# Patient Record
Sex: MALE | Race: WHITE | NOT HISPANIC OR LATINO | Employment: FULL TIME | URBAN - METROPOLITAN AREA
[De-identification: names, ages, dates, MRNs, and addresses within clinical notes are randomized per-mention and may not be internally consistent; named-entity substitution may affect disease eponyms.]

---

## 2017-03-30 ENCOUNTER — OFFICE VISIT (OUTPATIENT)
Dept: FAMILY MEDICINE | Facility: CLINIC | Age: 37
End: 2017-03-30
Payer: COMMERCIAL

## 2017-03-30 VITALS
SYSTOLIC BLOOD PRESSURE: 124 MMHG | DIASTOLIC BLOOD PRESSURE: 68 MMHG | RESPIRATION RATE: 16 BRPM | TEMPERATURE: 98 F | BODY MASS INDEX: 28.73 KG/M2 | HEIGHT: 69 IN | OXYGEN SATURATION: 100 % | HEART RATE: 72 BPM | WEIGHT: 194 LBS

## 2017-03-30 DIAGNOSIS — Z72.0 TOBACCO ABUSE: ICD-10-CM

## 2017-03-30 DIAGNOSIS — R09.81 NASAL CONGESTION: Primary | ICD-10-CM

## 2017-03-30 DIAGNOSIS — H69.93 DYSFUNCTION OF EUSTACHIAN TUBE, BILATERAL: ICD-10-CM

## 2017-03-30 DIAGNOSIS — E10.9 TYPE 1 DIABETES MELLITUS WITHOUT COMPLICATION (H): ICD-10-CM

## 2017-03-30 LAB — HBA1C MFR BLD: 7.2 % (ref 4.3–6)

## 2017-03-30 PROCEDURE — 99214 OFFICE O/P EST MOD 30 MIN: CPT | Performed by: FAMILY MEDICINE

## 2017-03-30 PROCEDURE — 36415 COLL VENOUS BLD VENIPUNCTURE: CPT | Performed by: FAMILY MEDICINE

## 2017-03-30 PROCEDURE — 83036 HEMOGLOBIN GLYCOSYLATED A1C: CPT | Performed by: FAMILY MEDICINE

## 2017-03-30 PROCEDURE — 80053 COMPREHEN METABOLIC PANEL: CPT | Performed by: FAMILY MEDICINE

## 2017-03-30 PROCEDURE — 82043 UR ALBUMIN QUANTITATIVE: CPT | Performed by: FAMILY MEDICINE

## 2017-03-30 PROCEDURE — 80061 LIPID PANEL: CPT | Performed by: FAMILY MEDICINE

## 2017-03-30 PROCEDURE — 84443 ASSAY THYROID STIM HORMONE: CPT | Performed by: FAMILY MEDICINE

## 2017-03-30 RX ORDER — NICOTINE 21 MG/24HR
1 PATCH, TRANSDERMAL 24 HOURS TRANSDERMAL EVERY 24 HOURS
Qty: 30 PATCH | Refills: 1 | Status: SHIPPED | OUTPATIENT
Start: 2017-03-30

## 2017-03-30 RX ORDER — FLUTICASONE PROPIONATE 50 MCG
1-2 SPRAY, SUSPENSION (ML) NASAL DAILY
Qty: 1 BOTTLE | Refills: 11 | Status: SHIPPED | OUTPATIENT
Start: 2017-03-30

## 2017-03-30 NOTE — PROGRESS NOTES
"Chief Complaint   Patient presents with     Ear Problem     ?? SINUS        Initial /68  Pulse 72  Temp 98  F (36.7  C)  Resp 16  Ht 5' 9\" (1.753 m)  Wt 194 lb (88 kg)  SpO2 100%  BMI 28.65 kg/m2 Estimated body mass index is 28.65 kg/(m^2) as calculated from the following:    Height as of this encounter: 5' 9\" (1.753 m).    Weight as of this encounter: 194 lb (88 kg).  Medication Reconciliation: complete. KATRINA Murphy LPN        SUBJECTIVE:                                                    Walt Butler is a 37 year old male who presents to clinic today for the following health issues:      Acute Illness   Acute illness concerns: c/o ears being plugged and pain   Onset: 1 month or more     Fever: no    Chills/Sweats: no    Headache (location?): YES    Sinus Pressure:YES    Conjunctivitis:  YES- pressure    Ear Pain: YES- bilateral plugged and pain,      sometimes feel  uncomfortable     Rhinorrhea: no    Congestion: YES- nasal    Sore Throat: no     Cough: no    Wheeze: no    Decreased Appetite: no    Nausea: no    Vomiting: no    Diarrhea:  no    Dysuria/Freq.: no    Fatigue/Achiness: no    Sick/Strep Exposure: no    He was treated for possible tooth infection couple of months  ago , he us better but still has ongoing congestion     He is a  Smoker for 20+ yrs, smoking 10-12 cig/ day, he know he has to quit  although he has not tried. He   is willing to quit. He has some dental issue , so he has to implants etc in next 6-8 months so he has been told that he should quit before taht      Therapies Tried and outcome: nothing          Diabetes Follow-up    Patient is checking blood sugars: three times daily.   : Patient modifying lifestyle changes (diet, exercise) with blood sugars  Results are as follows:         am - 70-80 fasting     Diabetic concerns: None, he has large deductible, so unable to afford the cost, that's why he does not come in often for  visit and labs often but willing to proceed with " "labs today      Symptoms of hypoglycemia (low blood sugar): none     Paresthesias (numbness or burning in feet) or sores: No     Date of last diabetic eye exam:  Year ago, planning to schedule again      Lipid Follow-Up      Rate your low fat/cholesterol diet?: good    Taking statin?  No    Other lipid medications/supplements?:  none       Problem list and histories reviewed & adjusted, as indicated.  Additional history: as documented    Patient Active Problem List   Diagnosis     Type 1 diabetes, HbA1c goal < 8% (H)     Hyperlipidemia with target LDL less than 100     Tobacco abuse     Plantar wart     Type 1 diabetes mellitus without complication (H)     Past Surgical History:   Procedure Laterality Date     C NONSPECIFIC PROCEDURE  2001    lipoma       Social History   Substance Use Topics     Smoking status: Current Every Day Smoker     Packs/day: 0.75     Years: 7.00     Types: Cigarettes     Smokeless tobacco: Never Used      Comment: quit  due to wife's pregnancy     Alcohol use No     Family History   Problem Relation Age of Onset     Family History Negative Mother      Family History Negative Father      CANCER Maternal Grandfather      lung           Reviewed and updated as needed this visit by clinical staff  Tobacco  Allergies  Meds  Fam Hx  Soc Hx      Reviewed and updated as needed this visit by Provider         ROS:  Constitutional, HEENT, cardiovascular, pulmonary, GI, , musculoskeletal, neuro, skin, endocrine and psych systems are negative, except as otherwise noted.    OBJECTIVE:                                                    /68  Pulse 72  Temp 98  F (36.7  C)  Resp 16  Ht 5' 9\" (1.753 m)  Wt 194 lb (88 kg)  SpO2 100%  BMI 28.65 kg/m2  Body mass index is 28.65 kg/(m^2).  GENERAL: healthy, alert and no distress  EYES: Eyes grossly normal to inspection, PERRL and conjunctivae and sclerae normal  HENT: ear canals and TM's normal, mouth without ulcers or lesions, nose " with swollen turbinates and clear  rhinorrhea . Throat with mild pharyngeal erythema, no sinus  tenderness  NECK: no adenopathy,  thyroid normal to palpation  RESP: lungs clear to auscultation - no rales, rhonchi or wheezes  CV: regular rate and rhythm, normal S1 S2, no S3 or S4, no murmur  ABDOMEN: soft, nontender, no hepatosplenomegaly, no masses and bowel sounds normal  MS:legs  no edema  SKIN: no suspicious lesions or rashes  NEURO: Normal strength and tone, mentation intact and speech normal  Diabetic foot exam: normal DP and PT pulses, no trophic changes or ulcerative lesions and normal sensory exam         ASSESSMENT/PLAN:                                                        (H69.83) Dysfunction of eustachian tube, bilateral  Comment: Nasal congestion, related   Plan: fluticasone (FLONASE) 50 MCG/ACT spray      (R09.81) Nasal congestion  (primary encounter diagnosis)  Comment:   Plan: fluticasone (FLONASE) 50 MCG/ACT spray        Willing to try Flonase to see if helps . F/u in 4-6 weeks if no improvement or ongoing problem, consider further evaluation if needed     (E10.9) Type 1 diabetes mellitus without complication (H)  Comment:   Plan: Hemoglobin A1c, LDL cholesterol direct, Albumin        Random Urine Quantitative, TSH with free T4         reflex, Comprehensive metabolic panel, FOOT         EXAM         Discussed cares, diet/ exercise . Talked about DM management , health risk etc. . Check lab, call pt with results. . Follow up as needed      (Z72.0) Tobacco abuse  Comment:   Plan: nicotine (NICODERM CQ) 14 MG/24HR 24 hr patch        encouraged pt to quit smoking and spent sometimes talking about ways to quit smoking.  info given .  willing to try patch. follow up in 4-8 weeks, sooner if problem.        Patient expressed understanding and agreement with treatment plan. All patient's questions were answered, will let me know if has more later.  Medications: Rx's: Reviewed the potential side  effects/complications of medications prescribed.       Leslie Kaminski MD  [facility] [place]

## 2017-03-30 NOTE — LETTER
[address] [place] [address]   [phone]      March 31, 2017    Walt Butler  [address] [facility]  [place] [address]      Walt,    Jeyson is a copy of your recent HA1C lab test. It looks good. Better than your previous. You can continue to manage your DM with diet and exercise. You should recheck in 4-6 months. If you have any further questions please do not hesitate to call my office.            Sincerely,   Leslie Kaminski M.D.

## 2017-03-30 NOTE — MR AVS SNAPSHOT
After Visit Summary   3/30/2017    Walt Butler    MRN: [id number]           Patient Information     Date Of Birth          1980        Visit Information        Provider Department      3/30/2017 3:00 PM Leslie Kaminski MD [facility]        Today's Diagnoses     Nasal congestion    -  1    Type 1 diabetes mellitus without complication (H)        Dysfunction of eustachian tube, bilateral        Tobacco abuse          Care Instructions      The Benefits of Living Smoke Free  What do you want to gain from quitting? Check off some reasons to quit.  Health benefits  ___  Improve my ability to breathe without coughing or shortness of breath  ___  Reduce my risk of lung cancer, heart disease, chronic lung disease  ___  Have fewer wrinkles and softer skin  ___  Improve my sense of taste and smell  ___  For pregnant women--reduce the risk of having a miscarriage, stillbirth, premature birth, or low-birth-weight baby  Personal benefits  ___  Feel more in control of my life  ___  Have better-smelling hair, breath, clothes, home, and car  ___  Save time by not having to take smoke breaks, buy cigarettes, or hunt for a light  ___  Have whiter teeth  Family benefits  ___  Reduce my children s respiratory tract infections  ___  Set a good example for my children  ___  Reduce my family s cancer risk  Financial benefits  ___  Save hundreds of dollars each year that would be spent on cigarettes  ___  Save money on medical bills  ___  Save on life, health, and car insurance premiums     Those dollars add up!  Cigarettes are expensive, and getting more expensive all the time. Do you realize how much money you are spending on cigarettes per year? What is the average amount you spend on a pack of cigarettes? What is the average number of packs that you smoke per day? Using your answers to these questions, fill in this formula to help you find out:  ($ _____ per pack) ×  ( _____ number of packs  per day) × (365 days) =  $ _____ yearly cost of smoking  Besides tobacco, there are other costs, including extra cleaning bills and replacement costs for clothing and furniture; medical expenses for smoking-related illnesses; and higher health, life, and car insurance premiums.  Cigars and pipes count too!  Cigars and pipes are also dangerous. So are smokeless (chewing) tobacco and snuff. All of these products contain nicotine, a highly addictive substance that has harmful effects on your body. Quitting smoking means giving up all tobacco products.      For more information    smokefree.gov/zwyk-xb-sn-expert    National Cancer [place] Smoking Quitline: 877-44U-QUIT (792-035-2008)     2619-8342 [employer]. [address] [place] [address]. All rights reserved. This information is not intended as a substitute for professional medical care. Always follow your healthcare professional's instructions.        Planning to Quit Smoking  Your healthcare provider may have told you that you need to give up tobacco. Only you can decide if and when you are ready to quit. Quitting is hard to do. But the benefits will be worth it. When you  decide to quit, come up with a plan that s right for you. Discuss your plan with your healthcare provider. And talk to your provider about medicines to help you quit.    Line up support  To quit smoking, you ll need a plan and some help. Pick a date within the next 2 to 4 weeks to quit. Use the time between now and that date to arrange for support.    Classes and counselors. Quit-smoking classes  people like you through the process. Get to know others in a class, and support each other beyond the class. Telephone counseling also helps you keep on track. Ask your healthcare provider, local hospital, or public health department to put you in touch with a class and a phone counselor.    Family and friends. Tell your family and friends about your quit date. Ask them to  support your change. If they smoke, arrange to see them in smoke-free places. Forbid smoking in your home.     Finding something to replace cigarettes may be hard to do. Be aware that some things you choose may be as harmful as cigarettes:    Smokeless (chewing) tobacco is just as harmful as regular tobacco. Tobacco should not be used as a substitute for cigarettes.    Herbal medicines or teas may affect how your body handles nicotine. Talk to your healthcare provider before using these products.    E-cigarettes have less toxins than the smoke from a regular cigarette. But the FDA says that these devices may still have substances that can cause cancer. E-cigarettes are not well regulated. They have not been studied enough to know if they are a good aid to help you stop smoking. Talk with your healthcare provider befor using these products.      Quit-smoking products  Many products can help you quit smoking. Some are prescription medicines that help curb your cravings and withdrawal symptoms. Other products slowly lessen the level of nicotine your body absorbs. Nicotine is the highly addictive substance found in cigarettes, cigars, and chewing tobacco. Nicotine replacement products can help get your body used to slowly decreasing amounts of nicotine after you quit smoking. These products include a nicotine patch, gum, lozenge, nasal spray, and inhaler. Be sure you follow the directions for your medicine or product carefully. Your healthcare provider may tell you to start taking the prescription medicine a week before you plan to quit. Do not smoke while you use nicotine products. Doing so can be very harmful to your health.  For more information    smokefree.gov/wefn-tz-qg-expert    National Cancer [place] Smoking Quitline: 877-44U-QUIT (008-858-6632)     1911-9533 The [employer]. [address] [place] [address]. All rights reserved. This information is not intended as a substitute for  professional medical care. Always follow your healthcare professional's instructions.        Understanding Nasal Allergies  Nasal allergies (also called allergic rhinitis) are a common health problem. They may be seasonal.This means they cause symptoms only at certain times of the year. Or they may be perennial.This means they cause symptoms all year long. Other health problems, such as asthma, often occur along with allergies as well.    What Is an allergic reaction?  An allergy is a reaction to a substance called an allergen. Common allergens include:    Wind-borne pollen    Mold    Dust mites    Furry and feathered animals    Cockroaches  Normally, allergens are harmless. But when a person has allergies, their body thinks they are harmful. Their body then attacks allergens with antibodies. Antibodies are attached to special cells called mast cells. Allergens stick to the antibodies. This makes the mast cells release histamine and other chemicals. This is an allergic reaction. The chemicals irritate nearby nasal tissue. This causes nasal allergy symptoms.  Common nasal allergy symptoms  Allergies can cause nasal tissue to swell. This makes the air passages smaller. The nose may feel stuffed up. The nose may also make extra mucus, which can plug the nasal passages or drip out of the nose. Mucus can drip down the back of the throat (postnasal drip) as well. Sinus tissue can swell. This may cause pain and headache. Common allergy symptoms include:    Runny nose with clear, watery discharge    Stuffy nose (nasal congestion)    Drainage down your throat (postnasal drip)    Sneezing    Red, watery eyes    Itchy nose, eyes, ears, and throat    Plugged-up ears (ear congestion)    Sore throat    Coughing    Sinus pain and swelling    Headache  It may not be allergies  Other health problems can cause symptoms like those of nasal allergies. These include:    Nonallergic rhinitis and viruses such as colds    Irritants and  "pollutants, such as strong odors or smoke    Certain medications    Changes in the weather   Treatment  Your health care provider will evaluate you to find the cause of your symptoms then recommend treatment. You may also be referred to an allergist.     3603-0645 [employer]. [address] [place] [address]. All rights reserved. This information is not intended as a substitute for professional medical care. Always follow your healthcare professional's instructions.              Follow-ups after your visit        Who to contact     If you have questions or need follow up information about today's clinic visit or your schedule please contact [facility] [place] directly at [phone].  Normal or non-critical lab and imaging results will be communicated to you by MyChart, letter or phone within 4 business days after the clinic has received the results. If you do not hear from us within 7 days, please contact the clinic through [employer] or phone. If you have a critical or abnormal lab result, we will notify you by phone as soon as possible.  Submit refill requests through [employer] or call your pharmacy and they will forward the refill request to us. Please allow 3 business days for your refill to be completed.          Additional Information About Your Visit        [employer] Information     [facility] lets you send messages to your doctor, view your test results, renew your prescriptions, schedule appointments and more. To sign up, go to [email or web address] . Click on \"Log in\" on the left side of the screen, which will take you to the Welcome page. Then click on \"Sign up Now\" on the right side of the page.     You will be asked to enter the access code listed below, as well as some personal information. Please follow the directions to create your username and password.     Your access code is: [email or web address]  Expires: 2017  3:34 PM     Your access code will  in 90 days. If you " "need help or a new code, please call your [facility] clinic or [phone].        Care EveryWhere ID     This is your Care EveryWhere ID. This could be used by other organizations to access your [facility] medical records  [id number]        Your Vitals Were     Pulse Temperature Respirations Height Pulse Oximetry BMI (Body Mass Index)    72 98  F (36.7  C) 16 5' 9\" (1.753 m) 100% 28.65 kg/m2       Blood Pressure from Last 3 Encounters:   03/30/17 124/68   05/16/16 98/60   04/06/15 118/60    Weight from Last 3 Encounters:   03/30/17 194 lb (88 kg)   05/16/16 178 lb 6.4 oz (80.9 kg)   04/06/15 178 lb (80.7 kg)              We Performed the Following     Albumin Random Urine Quantitative     Comprehensive metabolic panel     Hemoglobin A1c     LDL cholesterol direct     TSH with free T4 reflex          Today's Medication Changes          These changes are accurate as of: 3/30/17  3:34 PM.  If you have any questions, ask your nurse or doctor.               Start taking these medicines.        Dose/Directions    fluticasone 50 MCG/ACT spray   Commonly known as:  FLONASE   Used for:  Nasal congestion   Started by:  Leslie Kaminski MD        Dose:  1-2 spray   Spray 1-2 sprays into both nostrils daily   Quantity:  1 Bottle   Refills:  11       nicotine 14 MG/24HR 24 hr patch   Commonly known as:  NICODERM CQ   Used for:  Tobacco abuse   Started by:  Leslie Kaminski MD        Dose:  1 patch   Place 1 patch onto the skin every 24 hours   Quantity:  30 patch   Refills:  1            Where to get your medicines      These medications were sent to [facility] [address] [facility] [place] - [address] [facility] [address]  [address] [facility] [place] [address]    Hours:  Added 10/26 CK Checked with pharmacy Phone:  [phone]     fluticasone 50 MCG/ACT spray    nicotine 14 MG/24HR 24 hr patch                Primary Care Provider Office Phone # Fax #    DEIRDRE Saunders [phone]       " [facility] [address] [place] [address]        Thank you!     Thank you for choosing [facility] [place]  for your care. Our goal is always to provide you with excellent care. Hearing back from our patients is one way we can continue to improve our services. Please take a few minutes to complete the written survey that you may receive in the mail after your visit with us. Thank you!             Your Updated Medication List - Protect others around you: Learn how to safely use, store and throw away your medicines at www.disposemymeds.org.          This list is accurate as of: 3/30/17  3:34 PM.  Always use your most recent med list.                   Brand Name Dispense Instructions for use    ACCU-CHEK BIBI Kit     1 kit    daily       * ACE/ARB NOT PRESCRIBED (INTENTIONAL)      ACE & ARB not prescribed due to Other:       aspirin 81 MG tablet     100    1 tab po QD (Once per day)       blood glucose monitoring lancets     100 each    6 times daily. Before each meal, 2 hours after a meal and bedtime       fluticasone 50 MCG/ACT spray    FLONASE    1 Bottle    Spray 1-2 sprays into both nostrils daily       glucagon 1 MG kit    GLUCAGON EMERGENCY    1 mg    Inject 1 mg Subcutaneous once for 1 dose       nicotine 14 MG/24HR 24 hr patch    NICODERM CQ    30 patch    Place 1 patch onto the skin every 24 hours       NovoLIN N VIAL 100 UNIT/ML injection   Generic drug:  insulin NPH      Inject 15 Units Subcutaneous 2 times daily       NovoLIN R VIAL 100 UNIT/ML injection   Generic drug:  insulin regular      Inject 15 Units Subcutaneous 2 times daily (before meals)       ONE TOUCH ULTRA test strip   Generic drug:  blood glucose monitoring     200 strip    by In Vitro route See Admin Instructions. Up to 6 times daily       * STATIN NOT PRESCRIBED (INTENTIONAL)     0 each    continuous prn Statin not prescribed intentionally due to Refusal by patient       * Notice:  This list has 2  medication(s) that are the same as other medications prescribed for you. Read the directions carefully, and ask your doctor or other care provider to review them with you.

## 2017-03-30 NOTE — LETTER
[address] [place] [address]   [phone]      March 31, 2017    Walt Butler  [address] [facility]  [place] [address]            Dear Julio Luke    Enclosed is a copy of your recent lab results. If you have any further questions please do not hesitate to call my office.    Normal urine micro albumin (diabetes test for kidneys)  Normal complete metabolic panel that includes your liver function tests, kidney functions and  electrolytes(various salts in your body)    Normal thyroid ( TSH)  Normal lipid panel except slightly low HDL (good cholesterol). Low fat, High fiber, diet/ exercise can improve this number. Ok to watch and recheck in one year                          Sincerely,   Leslie Kaminski M.D.

## 2017-03-30 NOTE — PATIENT INSTRUCTIONS
The Benefits of Living Smoke Free  What do you want to gain from quitting? Check off some reasons to quit.  Health benefits  ___  Improve my ability to breathe without coughing or shortness of breath  ___  Reduce my risk of lung cancer, heart disease, chronic lung disease  ___  Have fewer wrinkles and softer skin  ___  Improve my sense of taste and smell  ___  For pregnant women reduce the risk of having a miscarriage, stillbirth, premature birth, or low-birth-weight baby  Personal benefits  ___  Feel more in control of my life  ___  Have better-smelling hair, breath, clothes, home, and car  ___  Save time by not having to take smoke breaks, buy cigarettes, or hunt for a light  ___  Have whiter teeth  Family benefits  ___  Reduce my children s respiratory tract infections  ___  Set a good example for my children  ___  Reduce my family s cancer risk  Financial benefits  ___  Save hundreds of dollars each year that would be spent on cigarettes  ___  Save money on medical bills  ___  Save on life, health, and car insurance premiums     Those dollars add up!  Cigarettes are expensive, and getting more expensive all the time. Do you realize how much money you are spending on cigarettes per year? What is the average amount you spend on a pack of cigarettes? What is the average number of packs that you smoke per day? Using your answers to these questions, fill in this formula to help you find out:  ($ _____ per pack) ×  ( _____ number of packs per day) × (365 days) =  $ _____ yearly cost of smoking  Besides tobacco, there are other costs, including extra cleaning bills and replacement costs for clothing and furniture; medical expenses for smoking-related illnesses; and higher health, life, and car insurance premiums.  Cigars and pipes count too!  Cigars and pipes are also dangerous. So are smokeless (chewing) tobacco and snuff. All of these products contain nicotine, a highly addictive substance that has harmful effects  on your body. Quitting smoking means giving up all tobacco products.      For more information    smokefree.gov/xsly-to-sk-expert    National Cancer [place] Smoking Quitline: 877-44U-QUIT (378-332-7839)     3027-2619 [employer]. [address] [place] [address]. All rights reserved. This information is not intended as a substitute for professional medical care. Always follow your healthcare professional's instructions.        Planning to Quit Smoking  Your healthcare provider may have told you that you need to give up tobacco. Only you can decide if and when you are ready to quit. Quitting is hard to do. But the benefits will be worth it. When you  decide to quit, come up with a plan that s right for you. Discuss your plan with your healthcare provider. And talk to your provider about medicines to help you quit.    Line up support  To quit smoking, you ll need a plan and some help. Pick a date within the next 2 to 4 weeks to quit. Use the time between now and that date to arrange for support.    Classes and counselors. Quit-smoking classes  people like you through the process. Get to know others in a class, and support each other beyond the class. Telephone counseling also helps you keep on track. Ask your healthcare provider, local hospital, or public health department to put you in touch with a class and a phone counselor.    Family and friends. Tell your family and friends about your quit date. Ask them to support your change. If they smoke, arrange to see them in smoke-free places. Forbid smoking in your home.     Finding something to replace cigarettes may be hard to do. Be aware that some things you choose may be as harmful as cigarettes:    Smokeless (chewing) tobacco is just as harmful as regular tobacco. Tobacco should not be used as a substitute for cigarettes.    Herbal medicines or teas may affect how your body handles nicotine. Talk to your healthcare provider before using  these products.    E-cigarettes have less toxins than the smoke from a regular cigarette. But the FDA says that these devices may still have substances that can cause cancer. E-cigarettes are not well regulated. They have not been studied enough to know if they are a good aid to help you stop smoking. Talk with your healthcare provider befor using these products.      Quit-smoking products  Many products can help you quit smoking. Some are prescription medicines that help curb your cravings and withdrawal symptoms. Other products slowly lessen the level of nicotine your body absorbs. Nicotine is the highly addictive substance found in cigarettes, cigars, and chewing tobacco. Nicotine replacement products can help get your body used to slowly decreasing amounts of nicotine after you quit smoking. These products include a nicotine patch, gum, lozenge, nasal spray, and inhaler. Be sure you follow the directions for your medicine or product carefully. Your healthcare provider may tell you to start taking the prescription medicine a week before you plan to quit. Do not smoke while you use nicotine products. Doing so can be very harmful to your health.  For more information    smokefree.gov/omyh-dn-pv-expert    National Cancer [place] Smoking Quitline: 877-44U-QUIT (670-256-9294)     8616-1771 [employer]. [address] [place] [address], [place] [address]. All rights reserved. This information is not intended as a substitute for professional medical care. Always follow your healthcare professional's instructions.        Understanding Nasal Allergies  Nasal allergies (also called allergic rhinitis) are a common health problem. They may be seasonal.This means they cause symptoms only at certain times of the year. Or they may be perennial.This means they cause symptoms all year long. Other health problems, such as asthma, often occur along with allergies as well.    What Is an allergic reaction?  An allergy is a  reaction to a substance called an allergen. Common allergens include:    Wind-borne pollen    Mold    Dust mites    Furry and feathered animals    Cockroaches  Normally, allergens are harmless. But when a person has allergies, their body thinks they are harmful. Their body then attacks allergens with antibodies. Antibodies are attached to special cells called mast cells. Allergens stick to the antibodies. This makes the mast cells release histamine and other chemicals. This is an allergic reaction. The chemicals irritate nearby nasal tissue. This causes nasal allergy symptoms.  Common nasal allergy symptoms  Allergies can cause nasal tissue to swell. This makes the air passages smaller. The nose may feel stuffed up. The nose may also make extra mucus, which can plug the nasal passages or drip out of the nose. Mucus can drip down the back of the throat (postnasal drip) as well. Sinus tissue can swell. This may cause pain and headache. Common allergy symptoms include:    Runny nose with clear, watery discharge    Stuffy nose (nasal congestion)    Drainage down your throat (postnasal drip)    Sneezing    Red, watery eyes    Itchy nose, eyes, ears, and throat    Plugged-up ears (ear congestion)    Sore throat    Coughing    Sinus pain and swelling    Headache  It may not be allergies  Other health problems can cause symptoms like those of nasal allergies. These include:    Nonallergic rhinitis and viruses such as colds    Irritants and pollutants, such as strong odors or smoke    Certain medications    Changes in the weather   Treatment  Your health care provider will evaluate you to find the cause of your symptoms then recommend treatment. You may also be referred to an allergist.     3744-5097 [employer]. [address] [place] [address], [place] [address]. All rights reserved. This information is not intended as a substitute for professional medical care. Always follow your healthcare professional's  instructions.

## 2017-03-31 LAB
ALBUMIN SERPL-MCNC: 4.2 G/DL (ref 3.4–5)
ALP SERPL-CCNC: 79 U/L (ref 40–150)
ALT SERPL W P-5'-P-CCNC: 16 U/L (ref 0–70)
ANION GAP SERPL CALCULATED.3IONS-SCNC: 7 MMOL/L (ref 3–14)
AST SERPL W P-5'-P-CCNC: 16 U/L (ref 0–45)
BILIRUB SERPL-MCNC: 0.2 MG/DL (ref 0.2–1.3)
BUN SERPL-MCNC: 9 MG/DL (ref 7–30)
CALCIUM SERPL-MCNC: 9.3 MG/DL (ref 8.5–10.1)
CHLORIDE SERPL-SCNC: 105 MMOL/L (ref 94–109)
CHOLEST SERPL-MCNC: 149 MG/DL
CO2 SERPL-SCNC: 28 MMOL/L (ref 20–32)
CREAT SERPL-MCNC: 0.86 MG/DL (ref 0.66–1.25)
CREAT UR-MCNC: 175 MG/DL
GFR SERPL CREATININE-BSD FRML MDRD: ABNORMAL ML/MIN/1.7M2
GLUCOSE SERPL-MCNC: 61 MG/DL (ref 70–99)
HDLC SERPL-MCNC: 35 MG/DL
LDLC SERPL CALC-MCNC: 89 MG/DL
MICROALBUMIN UR-MCNC: 13 MG/L
MICROALBUMIN/CREAT UR: 7.54 MG/G CR (ref 0–17)
NONHDLC SERPL-MCNC: 114 MG/DL
POTASSIUM SERPL-SCNC: 4.1 MMOL/L (ref 3.4–5.3)
PROT SERPL-MCNC: 7.5 G/DL (ref 6.8–8.8)
SODIUM SERPL-SCNC: 140 MMOL/L (ref 133–144)
TRIGL SERPL-MCNC: 124 MG/DL
TSH SERPL DL<=0.005 MIU/L-ACNC: 2.64 MU/L (ref 0.4–4)

## 2017-06-22 ENCOUNTER — TELEPHONE (OUTPATIENT)
Dept: FAMILY MEDICINE | Facility: CLINIC | Age: 37
End: 2017-06-22

## 2017-06-23 NOTE — TELEPHONE ENCOUNTER
Form faxed to [place] [employer] [phone]  Original/confirmation mailed to patient  Copy sent to [employer]  Alea BETTS

## 2017-08-22 ENCOUNTER — TELEPHONE (OUTPATIENT)
Dept: FAMILY MEDICINE | Facility: CLINIC | Age: 37
End: 2017-08-22

## 2017-08-22 NOTE — TELEPHONE ENCOUNTER
Form filled previously was for  [place] [employer] , bc of his dm, not disability. So need to be seen to evaluate ( not sure why is he filling disability)

## 2017-08-22 NOTE — TELEPHONE ENCOUNTER
Disability/Handicapped Status Verification Form received from [facility]  TC called patient to schedule an appointment but states we filled out a form in June and he was last seen 3/30/17  Routing Message and forms to PCP to advise on if form can be completed without appointment  Alea Edgar TC

## 2017-08-22 NOTE — TELEPHONE ENCOUNTER
[facility] called patient on his cell [phone]  [facility] Form completed on 6/22/17 was actually not completed  The Physician Section is not completed, it was just signed and because of it patient's license was suspended  Patient states he sent us something stating this back in June when he got the letter from the DMV; not sure what happened with this  So that needs to be filled out and faxed back ASAP so patient can get his license back.      Disability/Handicapped Status Verification Form:   Patient states he is applying for [facility] for an extended Medical  And this form has to be completed to show that patient's insurance has changed and he is having to come in for appointments and it is costing him more and more out of pocket.  If patient still needs to be seen for the form he is ok with that but he needs his license  Please fill out DMV ASAP and fax to DMV      Alea BETTS

## 2017-08-22 NOTE — TELEPHONE ENCOUNTER
DMV form faxed to the DMV and confirmation fax received  LVM for patient that he will need to be seen for the Disability/Handicapped Status Verification Form  Form in TC top bin  Patient's copy of the DMV form and confirmation also in same folder  Alea BETTS

## 2017-08-28 NOTE — TELEPHONE ENCOUNTER
Form sent to abstracting for when patient schedules an appointment  Mailed out the DMV corrected form and confirmation to patient  Alea BETTS

## 2018-10-02 ENCOUNTER — OFFICE VISIT (OUTPATIENT)
Dept: FAMILY MEDICINE | Facility: CLINIC | Age: 38
End: 2018-10-02

## 2018-10-02 VITALS
BODY MASS INDEX: 26.37 KG/M2 | WEIGHT: 174 LBS | HEIGHT: 68 IN | DIASTOLIC BLOOD PRESSURE: 75 MMHG | OXYGEN SATURATION: 97 % | HEART RATE: 81 BPM | TEMPERATURE: 98 F | SYSTOLIC BLOOD PRESSURE: 122 MMHG

## 2018-10-02 DIAGNOSIS — Z00.00 ROUTINE GENERAL MEDICAL EXAMINATION AT A HEALTH CARE FACILITY: Primary | ICD-10-CM

## 2018-10-02 DIAGNOSIS — Z72.0 TOBACCO ABUSE: ICD-10-CM

## 2018-10-02 DIAGNOSIS — E10.9 TYPE 1 DIABETES MELLITUS WITHOUT COMPLICATION (H): ICD-10-CM

## 2018-10-02 LAB — HBA1C MFR BLD: 10.2 % (ref 0–5.6)

## 2018-10-02 PROCEDURE — 99207 C FOOT EXAM  NO CHARGE: CPT | Mod: 25 | Performed by: FAMILY MEDICINE

## 2018-10-02 PROCEDURE — 99395 PREV VISIT EST AGE 18-39: CPT | Performed by: FAMILY MEDICINE

## 2018-10-02 PROCEDURE — 36415 COLL VENOUS BLD VENIPUNCTURE: CPT | Performed by: FAMILY MEDICINE

## 2018-10-02 PROCEDURE — 80053 COMPREHEN METABOLIC PANEL: CPT | Performed by: FAMILY MEDICINE

## 2018-10-02 PROCEDURE — 83036 HEMOGLOBIN GLYCOSYLATED A1C: CPT | Performed by: FAMILY MEDICINE

## 2018-10-02 PROCEDURE — 84443 ASSAY THYROID STIM HORMONE: CPT | Performed by: FAMILY MEDICINE

## 2018-10-02 PROCEDURE — 82043 UR ALBUMIN QUANTITATIVE: CPT | Performed by: FAMILY MEDICINE

## 2018-10-02 PROCEDURE — 99213 OFFICE O/P EST LOW 20 MIN: CPT | Mod: 25 | Performed by: FAMILY MEDICINE

## 2018-10-02 NOTE — PROGRESS NOTES
SUBJECTIVE:   CC: Walt Butler is an 38 year old male who presents for preventative health visit.     Healthy Habits:    Do you get at least three servings of calcium containing foods daily (dairy, green leafy vegetables, etc.)? yes    Amount of exercise or daily activities, outside of work: 7 day(s) per week    Problems taking medications regularly No    Medication side effects: No    Have you had an eye exam in the past two years? no    Do you see a dentist twice per year? yes    Do you have sleep apnea, excessive snoring or daytime drowsiness?no       Concern - DMV Form      Description:   Form completed for divers license . Denies any concerns with any hypoglycemic events, loosing control or any problem related to his Diabetes in regards to his driving etc.    Diabetes Follow-up    Patient is checking blood sugars: 3-6  times daily.    Blood sugar testing frequency justification: insulin dependent diabetes   Results are as follows:         am - 90's    Diabetic concerns: None he admits he needs to do better with DM management. Does not have insurance, so that is why he is not very compliant with visits and follow up on his DM. He feels well otherwise      Symptoms of hypoglycemia (low blood sugar): none     Paresthesias (numbness or burning in feet) or sores: No     Date of last diabetic eye exam:     BP Readings from Last 2 Encounters:   03/30/17 124/68   05/16/16 98/60     Hemoglobin A1C (%)   Date Value   03/30/2017 7.2 (H)   05/16/2016 7.9 (H)     LDL Cholesterol Calculated (mg/dL)   Date Value   03/30/2017 89   05/16/2016 73       Diabetes Management Resources    Today's PHQ-2 Score:   PHQ-2 ( 1999 Pf[employer]) 10/2/2018 3/30/2017   Q1: Little interest or pleasure in doing things 0 0   Q2: Feeling down, depressed or hopeless 0 0   PHQ-2 Score 0 0       Abuse: Current or Past(Physical, Sexual or Emotional)- No  Do you feel safe in your environment - Yes    Social History   Substance Use Topics     Smoking  status: Current Every Day Smoker     Packs/day: 0.75     Years: 7.00     Types: Cigarettes     Smokeless tobacco: Never Used      Comment: quit  due to wife's pregnancy     Alcohol use No      If you drink alcohol do you typically have >3 drinks per day or >7 drinks per week? No                      Last PSA: No results found for: PSA    Reviewed orders with patient. Reviewed health maintenance and updated orders accordingly - Yes  Patient Active Problem List   Diagnosis     Type 1 diabetes, HbA1c goal < 8% (H)     Hyperlipidemia with target LDL less than 100     Tobacco abuse     Plantar wart     Type 1 diabetes mellitus without complication (H)     Nasal congestion     Dysfunction of Eustachian tube, bilateral     Past Surgical History:   Procedure Laterality Date     C NONSPECIFIC PROCEDURE  2001    lipoma       Social History   Substance Use Topics     Smoking status: Current Every Day Smoker     Packs/day: 0.75     Years: 7.00     Types: Cigarettes     Smokeless tobacco: Never Used      Comment: quit  due to wife's pregnancy     Alcohol use No     Family History   Problem Relation Age of Onset     Family History Negative Mother      Family History Negative Father      Cancer Maternal Grandfather      lung           Reviewed and updated as needed this visit by clinical staff         Reviewed and updated as needed this visit by Provider        Past Medical History:   Diagnosis Date     Type I (juvenile type) diabetes mellitus without mention of complication, not stated as uncontrolled 7/7/1989        ROS:  CONSTITUTIONAL: NEGATIVE for fever, chills, change in weight  INTEGUMENTARY/SKIN: NEGATIVE for worrisome rashes, moles or lesions  EYES: NEGATIVE for vision changes or irritation  ENT: NEGATIVE for ear, mouth and throat problems  RESP: NEGATIVE for significant cough or SOB  CV: NEGATIVE for chest pain, palpitations or peripheral edema  GI: NEGATIVE for nausea, abdominal pain, heartburn, or change  in bowel habits   male: negative for dysuria, hematuria, decreased urinary stream, erectile dysfunction, urethral discharge  MUSCULOSKELETAL: NEGATIVE for significant arthralgias or myalgia  NEURO: NEGATIVE for weakness, dizziness or paresthesias  ENDOCRINE: NEGATIVE for temperature intolerance, skin/hair changes  HEME/ALLERGY/IMMUNE: NEGATIVE for bleeding problems  PSYCHIATRIC: NEGATIVE for changes in mood or affect    OBJECTIVE:   There were no vitals taken for this visit.  EXAM:  GENERAL: healthy, alert and no distress  EYES: Eyes grossly normal to inspection, PERRL and conjunctivae and sclerae normal  HENT: ear canals and TM's normal, nose and mouth without ulcers or lesions  NECK: no adenopathy, no asymmetry, masses, or scars and thyroid normal to palpation  RESP: lungs clear to auscultation - no rales, rhonchi or wheezes  CV: regular rate and rhythm, normal S1 S2, no S3 or S4, no murmur, click or rub, no peripheral edema   ABDOMEN: soft, nontender, no hepatosplenomegaly, no masses and bowel sounds normal   (male): normal male external genitalia and normal testicle/ penis   RECTAL: deferred  MS: no gross musculoskeletal defects noted, no edema  SKIN: no suspicious lesions or rashes  NEURO: Normal strength and tone, mentation intact and speech normal  PSYCH: mentation appears normal, affect normal/bright  Foot exam : No lesion , normal sensation by monofilament. Normal peripheral pulses  .         ASSESSMENT/PLAN:   (Z00.00) Routine general medical examination at a health care facility  (primary encounter diagnosis)  Comment:   Plan:     (E10.9) Type 1 diabetes mellitus without complication (H)  Comment:   Plan: Lipid panel reflex to direct LDL Fasting,         HEMOGLOBIN A1C, Albumin Random Urine         Quantitative with Creat Ratio, Comprehensive         metabolic panel, FOOT EXAM, TSH with free T4         reflex         Discussed cares, diet/ exercise . Talked about DM management , compliance,  health  "risk etc. Check lab, call pt with results.he usually gets his insulin OTC bc it is cheaper, so he does not want prescription.consider seeing  diabetic educator for annual follow up. Follow up here as needed          (Z72.0) Tobacco abuse  Comment: smoker   Plan: encouraged pt to quit smoking and spent sometimes talking about ways to quit smoking.  info given . May consider trying OTC patch again . Although he is not willing to try any other prescription treatment yet bc of cost mostly. follow up as needed if willing to try any other  treatment in future.       COUNSELING:  Reviewed preventive health counseling, as reflected in patient instructions       Regular exercise       Healthy diet/nutrition       Vision screening       Immunizations    Vaccinated for: Influenza and Pneumococcal          BP Readings from Last 1 Encounters:   03/30/17 124/68     Estimated body mass index is 28.65 kg/(m^2) as calculated from the following:    Height as of 3/30/17: 5' 9\" (1.753 m).    Weight as of 3/30/17: 194 lb (88 kg).      Weight management plan: Discussed healthy diet and exercise guidelines and patient will follow up in 12 months in clinic to re-evaluate.     reports that he has been smoking Cigarettes.  He has a 5.25 pack-year smoking history. He has never used smokeless tobacco.  Tobacco Cessation Action Plan: Phone counseling: Place order for QuitPlan (Tobacco Cessation [facility] Referral 4430)    Counseling Resources:  ATP IV Guidelines  Pooled Cohorts Equation Calculator  FRAX Risk Assessment  ICSI Preventive Guidelines  Dietary Guidelines for Americans, 2010  USDA's MyPlate  ASA Prophylaxis  Lung CA Screening    Leslie Kaminski MD  [facility]  "

## 2018-10-02 NOTE — MR AVS SNAPSHOT
After Visit Summary   10/2/2018    Walt Butler    MRN: [id number]           Patient Information     Date Of Birth          1980        Visit Information        Provider Department      10/2/2018 1:40 PM Leslie Kaminski MD [facility] [place]        Today's Diagnoses     Routine general medical examination at a health care facility    -  1    Type 1 diabetes mellitus without complication (H)          Care Instructions      Preventive Health Recommendations  Male Ages 26 - 39    Yearly exam:             See your health care provider every year in order to  o   Review health changes.   o   Discuss preventive care.    o   Review your medicines if your doctor has prescribed any.    You should be tested each year for STDs (sexually transmitted diseases), if you re at risk.     After age 35, talk to your provider about cholesterol testing. If you are at risk for heart disease, have your cholesterol tested at least every 5 years.     If you are at risk for diabetes, you should have a diabetes test (fasting glucose).  Shots: Get a flu shot each year. Get a tetanus shot every 10 years.     Nutrition:    Eat at least 5 servings of fruits and vegetables daily.     Eat whole-grain bread, whole-wheat pasta and brown rice instead of white grains and rice.     Get adequate Calcium and Vitamin D.     Lifestyle    Exercise for at least 150 minutes a week (30 minutes a day, 5 days a week). This will help you control your weight and prevent disease.     Limit alcohol to one drink per day.     No smoking.     Wear sunscreen to prevent skin cancer.     See your dentist every six months for an exam and cleaning.     How to Quit Smoking  Smoking is one of the hardest habits to break. About half of all people who have ever smoked have been able to quit. Most people who still smoke want to quit. Here are some of the best ways to stop smoking.    Keep trying  Most smokers make many attempts at quitting  before they are successful. It s important not to give up.  Go cold turkey  Most former smokers quit cold turkey (all at once). Trying to cut back gradually doesn't seem to work as well, perhaps because it continues the smoking habit. Also, it is possible to inhale more while smoking fewer cigarettes. This results in the same amount of nicotine in your body.  Get support  Support programs can be a big help, especially for heavy smokers. These groups offer lectures, ways to change behavior, and peer support. Here are some ways to find a support program:    Free national quitline: 800-QUIT-NOW (320-022-1585).    [facility] quit-smoking programs.    American Lung Association: (537.454.1132).    American Cancer Society (296-532-1957).  Support at home is important too. Nonsmokers can offer praise and encouragement. If the smoker in your life finds it hard to quit, encourage them to keep trying.  Over-the-counter medicines  Nicotine replacement therapy may make quitting easier. Certain aids, such as the nicotine patch, gum, and lozenges, are available without a prescription. It is best to use these under a doctor s care, though. The skin patch provides a steady supply of nicotine. Nicotine gum and lozenges give temporary bursts of low levels of nicotine. Both methods reduce the craving for cigarettes. Warning: If you have nausea, vomiting, dizziness, weakness, or a fast heartbeat, stop using these products and see your doctor.  Prescription medicines  After reviewing your smoking patterns and past attempts to quit, your doctor may offer a prescription medicine such as bupropion, varenicline, a nicotine inhaler, or nasal spray. Each has advantages and side effects. Your doctor can review these with you.  Health benefits of quitting  The benefits of quitting start right away and keep improving the longer you go without smoking. These benefits occur at any age.  So whether you are 17 or 70, quitting is a good decision. Some  "of the benefits include:    20 minutes: Blood pressure and pulse return to normal.    8 hours: Oxygen levels return to normal.    2 days: Ability to smell and taste begin to improve as damaged nerves regrow.    2 to 3 weeks: Circulation and lung function improve.    1 to 9 months: Coughing, congestion, and shortness of breath decrease; tiredness decreases.    1 year: Risk of heart attack decreases by half.    5 years: Risk of lung cancer decreases by half; risk of stroke becomes the same as a nonsmoker s.  For more on how to quit smoking, try these online resources:     Smokefree.gov    \"Clearing the Air\" booklet from the National Cancer [place]: smokefree.gov/sites/default/files/pdf/clearing-the-air-accessible.pdf  Date Last Reviewed: 3/1/2017    3437-2222 [employer]. [address] [place] [address]. All rights reserved. This information is not intended as a substitute for professional medical care. Always follow your healthcare professional's instructions.        Planning to Quit Smoking  Your healthcare provider may have told you that you need to give up tobacco. Only you can decide if and when you are ready to quit. Quitting is hard to do. But the benefits will be worth it. When you  decide to quit, come up with a plan that s right for you. Discuss your plan with your healthcare provider. And talk to your provider about medicines to help you quit.    Line up support  To quit smoking, you ll need a plan and some help. Pick a date within the next 2 to 4 weeks to quit. Use the time between now and that date to arrange for support.    Classes and counselors. Quit-smoking classes  people like you through the process. Get to know others in a class, and support each other beyond the class. Telephone counseling also helps you keep on track. Ask your healthcare provider, local hospital, or public health department to put you in touch with a class and a phone counselor.    Family and " friends. Tell your family and friends about your quit date. Ask them to support your change. If they smoke, arrange to see them in smoke-free places. Forbid smoking in your home and car.     Finding something to replace cigarettes may be hard to do. Be aware that some things you choose may be as harmful as cigarettes:    Smokeless (chewing) tobacco is just as harmful as regular tobacco. Tobacco should not be used as a substitute for cigarettes.    Herbal medicines or teas may affect how your body handles nicotine. Talk to your healthcare provider before using these products.    E-cigarettes have less toxins than the smoke from a regular cigarette. But the FDA says that these devices may still have substances that can cause cancer. E-cigarettes are not well regulated. They have not been studied enough to know if they are a good aid to help you stop smoking. Talk with your healthcare provider before using these products.      Quit-smoking products  Many products can help you quit smoking. Some are prescription medicines that help curb your cravings and withdrawal symptoms. Other products slowly lessen the level of nicotine your body absorbs. Nicotine is the highly addictive substance found in cigarettes, cigars, and chewing tobacco. Nicotine replacement products can help get your body used to slowly decreasing amounts of nicotine after you quit smoking. These products include a nicotine patch, gum, lozenge, nasal spray, and inhaler. Be sure you follow the directions for your medicine or product carefully. Your healthcare provider may tell you to start taking the prescription medicine a week before you plan to quit. Do not smoke while you use nicotine products. Doing so can be very harmful to your health.  For more information    https://smokefree.gov/ojzy-ab-eb-expert    National Cancer [place] Smoking Quitline: 877-44U-QUIT (828-910-2014)   Date Last Reviewed: 2/1/2017 2000-2017 [employer] [address]  "[place] [address]. All rights reserved. This information is not intended as a substitute for professional medical care. Always follow your healthcare professional's instructions.                Follow-ups after your visit        Follow-up notes from your care team     Return in about 6 months (around 4/2/2019), or sooner if problem .      Future tests that were ordered for you today     Open Future Orders        Priority Expected Expires Ordered    Lipid panel reflex to direct LDL Fasting Routine  10/2/2019 10/2/2018            Who to contact     If you have questions or need follow up information about today's clinic visit or your schedule please contact [facility] [place] directly at [phone].  Normal or non-critical lab and imaging results will be communicated to you by MyChart, letter or phone within 4 business days after the clinic has received the results. If you do not hear from us within 7 days, please contact the clinic through MyChart or phone. If you have a critical or abnormal lab result, we will notify you by phone as soon as possible.  Submit refill requests through [employer] or call your pharmacy and they will forward the refill request to us. Please allow 3 business days for your refill to be completed.          Additional Information About Your Visit        Care EveryWhere ID     This is your Care EveryWhere ID. This could be used by other organizations to access your [facility] medical records  [id number]        Your Vitals Were     Pulse Temperature Height Pulse Oximetry BMI (Body Mass Index)       81 98  F (36.7  C) (Tympanic) 5' 8.1\" (1.73 m) 97% 26.38 kg/m2        Blood Pressure from Last 3 Encounters:   10/02/18 122/75   03/30/17 124/68   05/16/16 98/60    Weight from Last 3 Encounters:   10/02/18 174 lb (78.9 kg)   03/30/17 194 lb (88 kg)   05/16/16 178 lb 6.4 oz (80.9 kg)              We Performed the Following     Albumin Random Urine Quantitative with Creat " Ratio     Comprehensive metabolic panel     FOOT EXAM     HEMOGLOBIN A1C     TSH with free T4 reflex        Primary Care Provider Office Phone # Fax #    Leslie Kaminski [phone]        [facility] [address]  [place] [address]        Equal Access to Services     GAUTAM ELLSWORTH : Hadii aad ku hadasho Soomaali, waaxda luqadaha, qaybta kaalmada adeegyada, waxay idiin hayaan adeeg khtalisha la'ayden ah. So [facility] [phone].    ATENCIÓN: Si habla español, tiene a inman disposición servicios gratuitos de asistencia lingüística. Llame al [phone].    We comply with applicable federal civil rights laws and [place] laws. We do not discriminate on the basis of race, color, national origin, age, disability, sex, sexual orientation, or gender identity.            Thank you!     Thank you for choosing [facility] [place]  for your care. Our goal is always to provide you with excellent care. Hearing back from our patients is one way we can continue to improve our services. Please take a few minutes to complete the written survey that you may receive in the mail after your visit with us. Thank you!             Your Updated Medication List - Protect others around you: Learn how to safely use, store and throw away your medicines at www.disposemymeds.org.          This list is accurate as of 10/2/18  2:31 PM.  Always use your most recent med list.                   Brand Name Dispense Instructions for use Diagnosis    ACCU-CHEK BIBI Kit     1 kit    daily    Type I (juvenile type) diabetes mellitus without mention of complication, not stated as uncontrolled       * ACE/ARB/ARNI NOT PRESCRIBED (INTENTIONAL)      ACE & ARB not prescribed due to Other:    Type 2 diabetes, HbA1C goal < 8% (H)       aspirin 81 MG tablet     100    1 tab po QD (Once per day)    Type I (juvenile type) diabetes mellitus without mention of complication, not stated as uncontrolled, Pure hypercholesterolemia       blood  glucose monitoring lancets     100 each    6 times daily. Before each meal, 2 hours after a meal and bedtime    Type I (juvenile type) diabetes mellitus without mention of complication, not stated as uncontrolled       fluticasone 50 MCG/ACT spray    FLONASE    1 Bottle    Spray 1-2 sprays into both nostrils daily    Nasal congestion       glucagon 1 MG kit    GLUCAGON EMERGENCY    1 mg    Inject 1 mg Subcutaneous once for 1 dose    Type 1 diabetes, HbA1c goal < 8% (H)       nicotine 14 MG/24HR 24 hr patch    NICODERM CQ    30 patch    Place 1 patch onto the skin every 24 hours    Tobacco abuse       NovoLIN N VIAL 100 UNIT/ML injection   Generic drug:  insulin NPH      Inject 15 Units Subcutaneous 2 times daily    Type 1 diabetes, HbA1c goal < 8% (H)       NovoLIN R VIAL 100 UNIT/ML injection   Generic drug:  insulin regular      Inject 15 Units Subcutaneous 2 times daily (before meals)    Type 1 diabetes, HbA1c goal < 8% (H)       ONETOUCH ULTRA test strip   Generic drug:  blood glucose monitoring     200 strip    by In Vitro route See Admin Instructions. Up to 6 times daily        * STATIN NOT PRESCRIBED (INTENTIONAL)     0 each    continuous prn Statin not prescribed intentionally due to Refusal by patient    Type 1 diabetes, HbA1c goal < 8% (H)       * Notice:  This list has 2 medication(s) that are the same as other medications prescribed for you. Read the directions carefully, and ask your doctor or other care provider to review them with you.

## 2018-10-02 NOTE — PATIENT INSTRUCTIONS
Preventive Health Recommendations  Male Ages 26 - 39    Yearly exam:             See your health care provider every year in order to  o   Review health changes.   o   Discuss preventive care.    o   Review your medicines if your doctor has prescribed any.    You should be tested each year for STDs (sexually transmitted diseases), if you re at risk.     After age 35, talk to your provider about cholesterol testing. If you are at risk for heart disease, have your cholesterol tested at least every 5 years.     If you are at risk for diabetes, you should have a diabetes test (fasting glucose).  Shots: Get a flu shot each year. Get a tetanus shot every 10 years.     Nutrition:    Eat at least 5 servings of fruits and vegetables daily.     Eat whole-grain bread, whole-wheat pasta and brown rice instead of white grains and rice.     Get adequate Calcium and Vitamin D.     Lifestyle    Exercise for at least 150 minutes a week (30 minutes a day, 5 days a week). This will help you control your weight and prevent disease.     Limit alcohol to one drink per day.     No smoking.     Wear sunscreen to prevent skin cancer.     See your dentist every six months for an exam and cleaning.     How to Quit Smoking  Smoking is one of the hardest habits to break. About half of all people who have ever smoked have been able to quit. Most people who still smoke want to quit. Here are some of the best ways to stop smoking.    Keep trying  Most smokers make many attempts at quitting before they are successful. It s important not to give up.  Go cold turkey  Most former smokers quit cold turkey (all at once). Trying to cut back gradually doesn't seem to work as well, perhaps because it continues the smoking habit. Also, it is possible to inhale more while smoking fewer cigarettes. This results in the same amount of nicotine in your body.  Get support  Support programs can be a big help, especially for heavy smokers. These groups offer  lectures, ways to change behavior, and peer support. Here are some ways to find a support program:    Free national quitline: 800-QUIT-NOW (089-517-2260).    Hospital quit-smoking programs.    American Lung Association: (531.144.4969).    American Cancer Society (510-601-7397).  Support at home is important too. Nonsmokers can offer praise and encouragement. If the smoker in your life finds it hard to quit, encourage them to keep trying.  Over-the-counter medicines  Nicotine replacement therapy may make quitting easier. Certain aids, such as the nicotine patch, gum, and lozenges, are available without a prescription. It is best to use these under a doctor s care, though. The skin patch provides a steady supply of nicotine. Nicotine gum and lozenges give temporary bursts of low levels of nicotine. Both methods reduce the craving for cigarettes. Warning: If you have nausea, vomiting, dizziness, weakness, or a fast heartbeat, stop using these products and see your doctor.  Prescription medicines  After reviewing your smoking patterns and past attempts to quit, your doctor may offer a prescription medicine such as bupropion, varenicline, a nicotine inhaler, or nasal spray. Each has advantages and side effects. Your doctor can review these with you.  Health benefits of quitting  The benefits of quitting start right away and keep improving the longer you go without smoking. These benefits occur at any age.  So whether you are 17 or 70, quitting is a good decision. Some of the benefits include:    20 minutes: Blood pressure and pulse return to normal.    8 hours: Oxygen levels return to normal.    2 days: Ability to smell and taste begin to improve as damaged nerves regrow.    2 to 3 weeks: Circulation and lung function improve.    1 to 9 months: Coughing, congestion, and shortness of breath decrease; tiredness decreases.    1 year: Risk of heart attack decreases by half.    5 years: Risk of lung cancer decreases by half;  "risk of stroke becomes the same as a nonsmoker s.  For more on how to quit smoking, try these online resources:     Smokefree.gov    \"Clearing the Air\" booklet from the National Cancer [place]: smokefree.gov/sites/default/files/pdf/clearing-the-air-accessible.pdf  Date Last Reviewed: 3/1/2017    9622-1519 [employer]. [address] [place] [address], [place] [address]. All rights reserved. This information is not intended as a substitute for professional medical care. Always follow your healthcare professional's instructions.        Planning to Quit Smoking  Your healthcare provider may have told you that you need to give up tobacco. Only you can decide if and when you are ready to quit. Quitting is hard to do. But the benefits will be worth it. When you  decide to quit, come up with a plan that s right for you. Discuss your plan with your healthcare provider. And talk to your provider about medicines to help you quit.    Line up support  To quit smoking, you ll need a plan and some help. Pick a date within the next 2 to 4 weeks to quit. Use the time between now and that date to arrange for support.    Classes and counselors. Quit-smoking classes  people like you through the process. Get to know others in a class, and support each other beyond the class. Telephone counseling also helps you keep on track. Ask your healthcare provider, local hospital, or public health department to put you in touch with a class and a phone counselor.    Family and friends. Tell your family and friends about your quit date. Ask them to support your change. If they smoke, arrange to see them in smoke-free places. Forbid smoking in your home and car.     Finding something to replace cigarettes may be hard to do. Be aware that some things you choose may be as harmful as cigarettes:    Smokeless (chewing) tobacco is just as harmful as regular tobacco. Tobacco should not be used as a substitute for cigarettes.    Herbal " medicines or teas may affect how your body handles nicotine. Talk to your healthcare provider before using these products.    E-cigarettes have less toxins than the smoke from a regular cigarette. But the FDA says that these devices may still have substances that can cause cancer. E-cigarettes are not well regulated. They have not been studied enough to know if they are a good aid to help you stop smoking. Talk with your healthcare provider before using these products.      Quit-smoking products  Many products can help you quit smoking. Some are prescription medicines that help curb your cravings and withdrawal symptoms. Other products slowly lessen the level of nicotine your body absorbs. Nicotine is the highly addictive substance found in cigarettes, cigars, and chewing tobacco. Nicotine replacement products can help get your body used to slowly decreasing amounts of nicotine after you quit smoking. These products include a nicotine patch, gum, lozenge, nasal spray, and inhaler. Be sure you follow the directions for your medicine or product carefully. Your healthcare provider may tell you to start taking the prescription medicine a week before you plan to quit. Do not smoke while you use nicotine products. Doing so can be very harmful to your health.  For more information    https://smokefree.gov/ceej-oh-pj-expert    National Cancer [place] Smoking Quitline: 877-44U-QUIT (861-578-9764)   Date Last Reviewed: 2/1/2017 2000-2017 [employer]. [address] [place] [address]. All rights reserved. This information is not intended as a substitute for professional medical care. Always follow your healthcare professional's instructions.

## 2018-10-03 LAB
ALBUMIN SERPL-MCNC: 4.3 G/DL (ref 3.4–5)
ALP SERPL-CCNC: 104 U/L (ref 40–150)
ALT SERPL W P-5'-P-CCNC: 19 U/L (ref 0–70)
ANION GAP SERPL CALCULATED.3IONS-SCNC: 8 MMOL/L (ref 3–14)
AST SERPL W P-5'-P-CCNC: 10 U/L (ref 0–45)
BILIRUB SERPL-MCNC: 0.5 MG/DL (ref 0.2–1.3)
BUN SERPL-MCNC: 14 MG/DL (ref 7–30)
CALCIUM SERPL-MCNC: 9.2 MG/DL (ref 8.5–10.1)
CHLORIDE SERPL-SCNC: 98 MMOL/L (ref 94–109)
CO2 SERPL-SCNC: 27 MMOL/L (ref 20–32)
CREAT SERPL-MCNC: 0.88 MG/DL (ref 0.66–1.25)
CREAT UR-MCNC: 47 MG/DL
GFR SERPL CREATININE-BSD FRML MDRD: >90 ML/MIN/1.7M2
GLUCOSE SERPL-MCNC: 359 MG/DL (ref 70–99)
MICROALBUMIN UR-MCNC: <5 MG/L
MICROALBUMIN/CREAT UR: NORMAL MG/G CR (ref 0–17)
POTASSIUM SERPL-SCNC: 4.1 MMOL/L (ref 3.4–5.3)
PROT SERPL-MCNC: 8.2 G/DL (ref 6.8–8.8)
SODIUM SERPL-SCNC: 133 MMOL/L (ref 133–144)
TSH SERPL DL<=0.005 MIU/L-ACNC: 1.77 MU/L (ref 0.4–4)

## 2018-10-04 ENCOUNTER — TELEPHONE (OUTPATIENT)
Dept: FAMILY MEDICINE | Facility: CLINIC | Age: 38
End: 2018-10-04

## 2018-10-04 NOTE — TELEPHONE ENCOUNTER
Done, thanks   Please tell patient that his A1c is much higher now 10.2. So his diabetes is out of control.  Could be that he has been noncompliant with his insulin.  If he has been taking his insulin quite regularly, that means he may need some adjustment .  we can have him see diabetic educator to help adjust his insulin dose.   Let me know if he wants me to put a referral for that.

## 2018-10-04 NOTE — TELEPHONE ENCOUNTER
Left non detailed message for patient to return call.  Mara Lopez RN   [facility] - Triage

## 2018-10-04 NOTE — TELEPHONE ENCOUNTER
Patient dropped off Insulin-Treated Diabetes Mellitus Report form  Old form printed for Provider reference  Placed form in Provider bin to complete/sign  Alea BETTS

## 2018-10-05 NOTE — TELEPHONE ENCOUNTER
The form has already been faxed to the DMV  And original with fax confirmation placed in mail  Copy sent to stat abstracting  Alea BETTS

## 2018-10-05 NOTE — TELEPHONE ENCOUNTER
I have o'kd   his form initially  for one year in the hopes that he will continue to mange his DM well .   Just tell his it is liability for me to sign papers for him, so I can not continue with cares if he is not willing to cooperate. If he thinks he know what he needs to do to improve his DM , then he need to make those changes in managing his DM.     Please uncheck the one year box and instead do 6 months. F/u on his form( if he has not picked up the form yet.     He should do a follow up A1C in 3-4 months, after making adjustment in his insulin and may be  life style changes.he is certainly welcome to see diabetic educator any time if he wants . Lab order placed  For future

## 2018-10-05 NOTE — TELEPHONE ENCOUNTER
"Attempted to call patient at home phone number listed, phone was answered by a male who stated the patient does not live there any more.  Unable to leave message and unable to contact anyone else as there is no CTC on file.     Huddled with TC and received mobile phone for patient.  Called patient regarding PCP note and patient replied, \"no, it's high because all of the shit that's going on in my life\".  Asked patient if he is taking insulin regularly and patient stated morning and night.  Informed patient PCP is recommending an appointment with diabetic educator and patient became very short with caller stating, \"nope, that's not gonna happen, because I don't have insurance and I don't have the money for it\".      Will route to provider as WONG GALVAN RN  [employer] Triage    "

## 2018-10-05 NOTE — TELEPHONE ENCOUNTER
That's ok. Just let still him know , that he needs to follow through my instructions for me to continue to help him

## 2019-03-12 ENCOUNTER — OFFICE VISIT (OUTPATIENT)
Dept: FAMILY MEDICINE | Facility: CLINIC | Age: 39
End: 2019-03-12
Payer: MEDICAID

## 2019-03-12 VITALS
SYSTOLIC BLOOD PRESSURE: 100 MMHG | OXYGEN SATURATION: 95 % | HEART RATE: 78 BPM | WEIGHT: 185 LBS | HEIGHT: 68 IN | DIASTOLIC BLOOD PRESSURE: 68 MMHG | BODY MASS INDEX: 28.04 KG/M2 | TEMPERATURE: 97.7 F

## 2019-03-12 DIAGNOSIS — E10.9 TYPE 1 DIABETES MELLITUS WITHOUT COMPLICATION (H): Primary | ICD-10-CM

## 2019-03-12 LAB — HBA1C MFR BLD: 9 % (ref 0–5.6)

## 2019-03-12 PROCEDURE — 99213 OFFICE O/P EST LOW 20 MIN: CPT | Performed by: NURSE PRACTITIONER

## 2019-03-12 PROCEDURE — 83036 HEMOGLOBIN GLYCOSYLATED A1C: CPT | Performed by: NURSE PRACTITIONER

## 2019-03-12 PROCEDURE — 80061 LIPID PANEL: CPT | Performed by: NURSE PRACTITIONER

## 2019-03-12 PROCEDURE — 36415 COLL VENOUS BLD VENIPUNCTURE: CPT | Performed by: NURSE PRACTITIONER

## 2019-03-12 ASSESSMENT — MIFFLIN-ST. JEOR: SCORE: 1728.65

## 2019-03-12 NOTE — PATIENT INSTRUCTIONS
1. I'll follow up with lab results  2. See diabetes education about restarting your prior insulin regimen  3. Let me know if I can do anything else for you
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
## 2019-03-13 LAB
CHOLEST SERPL-MCNC: 172 MG/DL
HDLC SERPL-MCNC: 37 MG/DL
LDLC SERPL CALC-MCNC: 121 MG/DL
NONHDLC SERPL-MCNC: 135 MG/DL
TRIGL SERPL-MCNC: 71 MG/DL

## 2019-03-13 RX ORDER — ATORVASTATIN CALCIUM 10 MG/1
10 TABLET, FILM COATED ORAL DAILY
Qty: 90 TABLET | Refills: 1 | Status: SHIPPED | OUTPATIENT
Start: 2019-03-13

## 2022-01-01 ENCOUNTER — HOSPITAL ENCOUNTER (EMERGENCY)
Facility: CLINIC | Age: 42
Discharge: HOME | End: 2022-04-18
Attending: EMERGENCY MEDICINE | Admitting: EMERGENCY MEDICINE
Payer: MEDICAID

## 2022-01-01 ENCOUNTER — PATIENT OUTREACH (OUTPATIENT)
Dept: FAMILY MEDICINE | Facility: CLINIC | Age: 42
End: 2022-01-01
Payer: MEDICAID

## 2022-01-01 ENCOUNTER — NURSE TRIAGE (OUTPATIENT)
Dept: FAMILY MEDICINE | Facility: CLINIC | Age: 42
End: 2022-01-01
Payer: MEDICAID

## 2022-01-01 VITALS
DIASTOLIC BLOOD PRESSURE: 75 MMHG | TEMPERATURE: 98.3 F | SYSTOLIC BLOOD PRESSURE: 123 MMHG | OXYGEN SATURATION: 97 % | BODY MASS INDEX: 26.66 KG/M2 | HEART RATE: 85 BPM | RESPIRATION RATE: 18 BRPM | HEIGHT: 69 IN | WEIGHT: 180 LBS

## 2022-01-01 DIAGNOSIS — S51.811A LACERATION OF RIGHT FOREARM, INITIAL ENCOUNTER: ICD-10-CM

## 2022-01-01 PROCEDURE — 99283 EMERGENCY DEPT VISIT LOW MDM: CPT

## 2022-01-01 PROCEDURE — 12002 RPR S/N/AX/GEN/TRNK2.6-7.5CM: CPT

## 2022-01-01 ASSESSMENT — ENCOUNTER SYMPTOMS
COUGH: 0
WOUND: 1
FEVER: 0
SORE THROAT: 0

## 2022-04-18 NOTE — ED PROVIDER NOTES
"  History   Chief Complaint:  Laceration       The history is provided by the patient.      Walt Butler is a 42 year old male with history of type I diabetes who presents with forearm laceration. 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 When he lifted the bowl, a sharp edge of the wound lacerated his right forearm. He did not obtain any other injuries. His tetanus is up to date. He has not had any issues with his blood sugars lately and has been taking insulin consistently. He denies any recent illness including fever, sore throat, and cough.       Review of Systems   Constitutional: Negative for fever.   HENT: Negative for sore throat.    Respiratory: Negative for cough.    Skin: Positive for wound.   All other systems reviewed and are negative.      Allergies:  The patient has no known allergies.     Medications:  Aspirin 81 mg   Lipitor  Flonase  Glucagon   Insulin     Past Medical History:     Type I diabetes  Hyperlipidemia  Tobacco abuse  Plantar wart  Nasal congestion   Dysfunction of Eustachian tube, bilateral       Past Surgical History:    Lipoma removal     Family History:    The patient has no known family history.    Social History:  The patient presents to the ED alone. He works construction. He sees Dr. Langley for his primary care.       Physical Exam     Patient Vitals for the past 24 hrs:   BP Temp Temp src Pulse Resp SpO2 Height Weight   04/18/22 1429 123/75 98.3  F (36.8  C) Temporal 85 18 97 % 1.753 m (5' 9\") 81.6 kg (180 lb)       Physical Exam      Physical Exam   Constitutional:  Patient is oriented to person, place, and time. They appear well-developed and well-nourished. Mild distress secondary to laceration.    HENT:   Eyes:    Conjunctivae normal and EOM are normal. Pupils are equal, round, and reactive to light.   Neck:    Normal range of motion.   Cardiovascular: Normal rate, regular rhythm and normal heart sounds.  Exam reveals no gallop " and no friction rub.  No murmur heard.  Pulmonary/Chest:  Effort normal and breath sounds normal. Patient has no wheezes. Patient has no rales.   Musculoskeletal:  Normal range of motion. Patient exhibits no edema.   Neurological:   Patient is alert and oriented to person, place, and time. Patient has normal strength. No cranial nerve deficit or sensory deficit. GCS 15  Skin:    3 cm laceration to the right volar aspect of forearm just to the subcutaneous fat. No FB on wound exploration.. Bleeding controlled. Distal pulses in tact. Sensation in tact.   Psychiatric:   Patient has a normal mood and affect. Patient's behavior is normal. Judgment and thought content normal.         Emergency Department Course     Procedures    Laceration Repair      Procedure: Laceration Repair    Indication: Laceration    Consent: Verbal    Location: Right Forearm     Length: 3 cm    Preparation: Irrigation with Sterile Saline.    Anesthesia: Lidocaine with Epinephrine - 1% 2 mL   Anxiolysis: None  Sedation: No sedation.      Treatment/Exploration: Wound explored, no foreign bodies found     Closure: The wound was closed in one layer. Skin was closed with 6 x 4.0 Nylon using Interrupted sutures.     Patient Status: The patient tolerated the procedure well: Yes.       Emergency Department Course:             Reviewed:  I reviewed nursing notes, vitals, past medical history and MIIC    Assessments:  1646 I obtained history and examined the patient as noted above.   1702 I rechecked the patient and performed the laceration repair as noted above.     Disposition:  The patient was discharged to home.     Impression & Plan     CMS Diagnoses: None    Medical Decision Making:    Walt Butler is a 42 year old male presents for evaluation of a laceration to the right forearm.  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 The wound was carefully evaluated and explored. There was no foreign  body identified. CMS is intact.  There is no evidence of muscular, tendon, bone, or nerve damage with this laceration.  The laceration was closed as noted in the procedure note. The patient tolerated the procedure well .  Possible complications (infection, scarring) were reviewed with the patient. Appropriate wound dressing was placed and daily cares were discussed. Tetanus is up to date. They will be discharged home with primary care follow up in 10 days for suture removal. They will return immediately for fevers, purulent drainage, spreading redness, increased pain or any other concerning symptoms. Patient agrees with the plan and all questions/concerns addressed prior to discharge home.       Diagnosis:    ICD-10-CM    1. Laceration of right forearm, initial encounter  S51.811A        Scribe Disclosure:  I, Alberta Paez, am serving as a scribe on 4/18/2022 at 4:51 PM to personally document services performed by Riddhi Rizzo MD based on my observations and the provider's statements to me.            Riddhi Rizzo MD  04/18/22 1710

## 2022-04-18 NOTE — DISCHARGE INSTRUCTIONS
Stitches out in 10 days.  Keep clean and dry.  Return if any signs of infection   This does not sound like infection. I have no idea what to treat with w/o seeing patient.  I would recommnd advill 600-800 mg OTC 3 times daily until urology can fit him in or go to urgent care for evaluation

## 2022-04-19 NOTE — TELEPHONE ENCOUNTER
What type of discharge? Emergency Department  Risk of Hospital admission or ED visit: 58%  Is a TCM episode required? No  When should the patient follow up with PCP? 7 days of discharge.    Jessica Disla RN  [facility]

## 2022-04-20 NOTE — TELEPHONE ENCOUNTER
"Called to follow up on recent ED visit. Pt states he is having increase in pain, redness, and swelling around suture site. Stating there is now a 4-5 in round red [place] around site that is warm and tender to touch. Pt advised to seek attention at UCC/ED. Pt states he will head to ED this evening to be seen.      Reason for Disposition    Wound looks infected    Looks infected (spreading redness, red streak, pus) and fever    Answer Assessment - Initial Assessment Questions  1. LOCATION: \"Where are the sutures (or staples) located?\"       Upper right forearm  2. NUMBER: \"How many sutures (or staples) are there?\"       6  3. DATE IN: \"When were the sutures (or staples) put in?\"        Monday 4/18    4. DATE OUT: \"When did your doctor tell you the sutures (or staples) needed to come out?\"      Told to remove 10 days later  5. OTHER SYMPTOMS: \"Do you have any other symptoms?\" (e.g., wound pain, discharge, fever?)      Pain, redness/swelling around sutures- about 5 in around sutures. Pt breann a line around swelling two hours ago and states the swelling and redness has grown since     6. PREGNANCY: \"Is there any chance you are pregnant?\" \"When was your last menstrual period?\"      NA    Protocols used: SUTURE OR STAPLE [id number], WOUND INFECTION-A-OH    GO TO ED/UCC NOW (OR TO OFFICE WITH PCP APPROVAL):     NOTE TO TRIAGER:   * Use nurse judgment to select the most appropriate source of care. Consider both the urgency of the patient's symptoms AND what resources may be needed to evaluate and manage the patient.   * Then tell the caller: Go to ED Leave NOW.     SOURCES OF CARE:  * TRIAGER CAUTION: In selecting the most appropriate care site, you must consider both the severity of the patient's symptoms AND what resources are available at that care site.  * ED: Patients who may need surgery, sound seriously ill or may be unstable need to be sent to an ED. Likewise, so do most patients with complex medical problems and " serious symptoms.  * UCC: Some UCCs can manage patients who are stable and have less serious symptoms (e.g., minor illnesses and injuries). The triager must know the UCC capabilities before sending a patient there. If unsure, call ahead.  * OFFICE: If patient sounds stable and not seriously ill, consult PCP (or follow your office policy) to see if patient can be seen NOW in office.      CALL BACK IF:   * Wound becomes more tender   * Redness starts to spread   * Pus, drainage, or fever occurs   * You become worse        Patient/Caregiver understands and will follow care advice? Yes, plans to follow advice     Susannah MARQUES RN  EP Triage     You can access the FollowMyHealth Patient Portal offered by [facility] by registering at the following website: [email or web address]. By joining [employer]’s FollowMyHealth portal, you will also be able to view your health information using other applications (apps) compatible with our system.

## 2022-04-20 NOTE — TELEPHONE ENCOUNTER
"Pt called for hospital follow up. Pt states he is having new s/s of increased swelling, redness, and pain around suture site. See telephone encounter from 4/20/22.    Susannah MARQUES RN  EP Triage        ED for acute condition Discharge Protocol    \"Hi, my name is Susannah Lopez RN, a registered nurse, and I am calling from [facility].  I am calling to follow up and see how things are going for you after your recent emergency visit.\"    Tell me how you are doing now that you are home?\" pain is excruciating 9/10. Fingers are numb from cut to fingers. States now he has 4-5 in round [place] around cut/stitches       Discharge Instructions    \"Let's review your discharge instructions.  What is/are the follow-up recommendations?  Pt. Response: not a aguilar thing, states he waited 3 hours. Told to follow up in 10 days with PCP and have stitches removed.    \"Has an appointment with your primary care provider been scheduled?\"  No (needed - schedule appointment and remind to bring meds)    Medications    \"Tell me what changed about your medicines when you discharged?\"    No     \"What questions do you have about your medications?\"   None        Call Summary    \"What questions or concerns do you have about your recent visit and your follow-up care?\"     none    \"If you have questions or things don't continue to improve, we encourage you contact us through the main clinic number (give number).  Even if the clinic is not open, triage nurses are available 24/7 to help you.     We would like you to know that our clinic has extended hours (provide information).  We also have urgent care (provide details on closest location and hours/contact info)\"    \"Thank you for your time and take care!\"        Susannah MARQUES RN  EP Triage        "
